# Patient Record
Sex: FEMALE | Race: BLACK OR AFRICAN AMERICAN | NOT HISPANIC OR LATINO | Employment: FULL TIME | URBAN - METROPOLITAN AREA
[De-identification: names, ages, dates, MRNs, and addresses within clinical notes are randomized per-mention and may not be internally consistent; named-entity substitution may affect disease eponyms.]

---

## 2018-04-06 ENCOUNTER — THERAPY VISIT (OUTPATIENT)
Dept: PHYSICAL THERAPY | Facility: CLINIC | Age: 33
End: 2018-04-06
Payer: COMMERCIAL

## 2018-04-06 DIAGNOSIS — Z47.89 AFTERCARE FOLLOWING SURGERY OF THE MUSCULOSKELETAL SYSTEM: ICD-10-CM

## 2018-04-06 DIAGNOSIS — S82.891A ANKLE FRACTURE, RIGHT, CLOSED, INITIAL ENCOUNTER: Primary | ICD-10-CM

## 2018-04-06 PROCEDURE — 97110 THERAPEUTIC EXERCISES: CPT | Mod: GP | Performed by: PHYSICAL THERAPIST

## 2018-04-06 PROCEDURE — 97161 PT EVAL LOW COMPLEX 20 MIN: CPT | Mod: GP | Performed by: PHYSICAL THERAPIST

## 2018-04-06 NOTE — LETTER
[place] [facility]  [address]  [address]  [facility] [address]  [phone]    2018    Re: Sara Ross   :   1985  MRN:  [id number]   REFERRING PHYSICIAN:   [facility]    [facility]  Date of Initial Evaluation:  18  Visits:  Rxs Used: 1  Reason for Referral:     Ankle fracture, right, closed, initial encounter  Aftercare following surgery of the musculoskeletal system    EVALUATION SUMMARY    [place] [facility] Initial Evaluation  Subjective:  Patient is a 32 year old female presenting with rehab right ankle/foot hpi.   Sara Ross is a 32 year old female with a right ankle condition.        Pt slipped and fell on ice on 2/3/2018, fractured both tibia and fibula, pt waited two weeks due to swelling, and then went in for surgery ORIF with hardware on 2/15/2018.  Pt was then put in boot for 4 weeks and was now told that she can start PT and work out of the boot with WBAT.  .    Patient reports pain:  Lateral and medial.    Pain is described as aching and is intermittent and reported as 3/10 and 5/10.  Associated symptoms:  Loss of strength and loss of motion/stiffness. Worse during: pt does have difficulty sleeping d/t her ankle.  Symptoms are exacerbated by walking, ascending stairs, descending stairs, bending/squatting, activity, weight bearing and standing   Since onset symptoms are gradually improving.    Previous treatment includes surgery.    General health as reported by patient is excellent.  Pertinent medical history includes:  None.      Current medications:  None as reported by patient.  Current occupation is Pt works at the [employer] in , is currently not working due to her issue.  Pt has a follow up with her surgeon on 2018 and will discuss return to work plans at that time.  .    Primary job tasks include:  Prolonged standing.  Barriers include:  None as reported by  patient.  Red flags:  None as reported by patient.                Objective:  Gait:    Gait Type:  Antalgic   Assistive Devices:  Brace    Ankle/Foot Evaluation  ROM:    AROM:    Dorsiflexion:  Left:   12  Right:   0  Plantarflexion:  Left:  66    Right:  54  Inversion:  Left:  40     Right:  19  Eversion:  25     Right:  15  Strength:    Dorsiflexion:  Left: 5/5     Pain:   Right: 5/5   Pain:  Plantarflexion: Left: 5/5   Pain:   Right: 4/5  Pain:  Inversion:Left: 5/5  Pain:     Right: 3+/5  Pain:  Eversion:Left: 5/5  Pain:  Right: 3+/5  Pain:  Re: Sara TRISTAN Asefa   :   1985    LIGAMENT TESTING: not assessed    SPECIAL TESTS: not assessed    PALPATION:   Right ankle tenderness present at:   Incisional    EDEMA:   Right ankle edema present at:  medial; lateral; anterior and posterior        FUNCTIONAL TESTS: Functional test ankle: single leg stance: 5 sec with compensations through core and with hands.    Assessment/Plan:    Patient is a 32 year old female with right side ankle complaints.    Patient has the following significant findings with corresponding treatment plan.                Diagnosis 1:  S/p R ankle tib/fib fracture with ORIF      Pain -  hot/cold therapy, manual therapy, self management, education and home program  Decreased ROM/flexibility - manual therapy and therapeutic exercise  Decreased strength - therapeutic exercise and therapeutic activities  Impaired gait - gait training  Impaired muscle performance - neuro re-education  Decreased function - therapeutic activities    Therapy Evaluation Codes:   1) History comprised of:   Personal factors that impact the plan of care:      Gender and Language.    Comorbidity factors that impact the plan of care are:      None.     Medications impacting care: None.  2) Examination of Body Systems comprised of:   Body structures and functions that impact the plan of care:      Ankle.   Activity limitations that impact the plan of care are:       Squatting/kneeling, Stairs, Standing, Walking and Working.  3) Clinical presentation characteristics are:   Evolving/Changing.  4) Decision-Making    Low complexity using standardized patient assessment instrument and/or measureable assessment of functional outcome.  Cumulative Therapy Evaluation is: Low complexity.    Previous and current functional limitations:  (See Goal Flow Sheet for this information)    Short term and Long term goals: (See Goal Flow Sheet for this information)     Communication ability:  Patient appears to be able to clearly communicate and understand verbal and written communication and follow directions correctly.  Treatment Explanation - The following has been discussed with the patient:   RX ordered/plan of care  Anticipated outcomes  Possible risks and side effects  This patient would benefit from PT intervention to resume normal activities.   Rehab potential is good.  Re: Sara Ross   :   1985    Frequency:  2 X week, once daily  Duration:  for 5 weeks  Discharge Plan:  Achieve all LTG.  Independent in home treatment program.  Reach maximal therapeutic benefit.    Please refer to the daily flowsheet for treatment today, total treatment time and time spent performing 1:1 timed codes.     Thank you for your referral.    INQUIRIES  Therapist: Griffin Martinez DPT   [facility]  [address]  [address] [place] [id number]  Phone: [phone]  Fax: [phone]

## 2018-04-06 NOTE — PROGRESS NOTES
[place] [facility] Initial Evaluation  Subjective:  Patient is a 32 year old female presenting with rehab right ankle/foot hpi.   Sara Ross is a 32 year old female with a right ankle condition.        Pt slipped and fell on ice on 2/3/2018, fractured both tibia and fibula, pt waited two weeks due to swelling, and then went in for surgery ORIF with hardware on 2/15/2018.  Pt was then put in boot for 4 weeks and was now told that she can start PT and work out of the boot with WBAT.  .    Patient reports pain:  Lateral and medial.    Pain is described as aching and is intermittent and reported as 3/10 and 5/10.  Associated symptoms:  Loss of strength and loss of motion/stiffness. Worse during: pt does have difficulty sleeping d/t her ankle.  Symptoms are exacerbated by walking, ascending stairs, descending stairs, bending/squatting, activity, weight bearing and standing   Since onset symptoms are gradually improving.    Previous treatment includes surgery.    General health as reported by patient is excellent.  Pertinent medical history includes:  None.      Current medications:  None as reported by patient.  Current occupation is Pt works at the [employer] in , is currently not working due to her issue.  Pt has a follow up with her surgeon on 4/16/2018 and will discuss return to work plans at that time.  .    Primary job tasks include:  Prolonged standing.    Barriers include:  None as reported by patient.    Red flags:  None as reported by patient.                        Objective:    Gait:    Gait Type:  Antalgic   Assistive Devices:  Brace            Ankle/Foot Evaluation  ROM:    AROM:    Dorsiflexion:  Left:   12  Right:   0  Plantarflexion:  Left:  66    Right:  54  Inversion:  Left:  40     Right:  19  Eversion:  25     Right:  15        Strength:    Dorsiflexion:  Left: 5/5     Pain:   Right: 5/5   Pain:  Plantarflexion: Left: 5/5   Pain:   Right: 4/5  Pain:  Inversion:Left: 5/5  Pain:      Right: 3+/5  Pain:  Eversion:Left: 5/5  Pain:  Right: 3+/5  Pain:                  LIGAMENT TESTING: not assessed              SPECIAL TESTS: not assessed    PALPATION:     Right ankle tenderness present at:   incisional  EDEMA:     Right ankle edema present at:  medial; lateral; anterior and posterior          FUNCTIONAL TESTS: Functional test ankle: single leg stance: 5 sec with compensations through core and with hands.                                                              General     ROS    Assessment/Plan:    Patient is a 32 year old female with right side ankle complaints.    Patient has the following significant findings with corresponding treatment plan.                Diagnosis 1:  S/p R ankle tib/fib fracture with ORIF      Pain -  hot/cold therapy, manual therapy, self management, education and home program  Decreased ROM/flexibility - manual therapy and therapeutic exercise  Decreased strength - therapeutic exercise and therapeutic activities  Impaired gait - gait training  Impaired muscle performance - neuro re-education  Decreased function - therapeutic activities    Therapy Evaluation Codes:   1) History comprised of:   Personal factors that impact the plan of care:      Gender and Language.    Comorbidity factors that impact the plan of care are:      None.     Medications impacting care: None.  2) Examination of Body Systems comprised of:   Body structures and functions that impact the plan of care:      Ankle.   Activity limitations that impact the plan of care are:      Squatting/kneeling, Stairs, Standing, Walking and Working.  3) Clinical presentation characteristics are:   Evolving/Changing.  4) Decision-Making    Low complexity using standardized patient assessment instrument and/or measureable assessment of functional outcome.  Cumulative Therapy Evaluation is: Low complexity.    Previous and current functional limitations:  (See Goal Flow Sheet for this information)    Short term and  Long term goals: (See Goal Flow Sheet for this information)     Communication ability:  Patient appears to be able to clearly communicate and understand verbal and written communication and follow directions correctly.  Treatment Explanation - The following has been discussed with the patient:   RX ordered/plan of care  Anticipated outcomes  Possible risks and side effects  This patient would benefit from PT intervention to resume normal activities.   Rehab potential is good.    Frequency:  2 X week, once daily  Duration:  for 5 weeks  Discharge Plan:  Achieve all LTG.  Independent in home treatment program.  Reach maximal therapeutic benefit.    Please refer to the daily flowsheet for treatment today, total treatment time and time spent performing 1:1 timed codes.

## 2018-04-10 ENCOUNTER — THERAPY VISIT (OUTPATIENT)
Dept: PHYSICAL THERAPY | Facility: CLINIC | Age: 33
End: 2018-04-10
Payer: COMMERCIAL

## 2018-04-10 DIAGNOSIS — S82.891A ANKLE FRACTURE, RIGHT, CLOSED, INITIAL ENCOUNTER: ICD-10-CM

## 2018-04-10 DIAGNOSIS — Z47.89 AFTERCARE FOLLOWING SURGERY OF THE MUSCULOSKELETAL SYSTEM: ICD-10-CM

## 2018-04-10 PROCEDURE — 97110 THERAPEUTIC EXERCISES: CPT | Mod: GP | Performed by: PHYSICAL THERAPIST

## 2018-04-10 PROCEDURE — 97112 NEUROMUSCULAR REEDUCATION: CPT | Mod: GP | Performed by: PHYSICAL THERAPIST

## 2018-04-12 ENCOUNTER — THERAPY VISIT (OUTPATIENT)
Dept: PHYSICAL THERAPY | Facility: CLINIC | Age: 33
End: 2018-04-12
Payer: COMMERCIAL

## 2018-04-12 DIAGNOSIS — S82.891A ANKLE FRACTURE, RIGHT, CLOSED, INITIAL ENCOUNTER: ICD-10-CM

## 2018-04-12 DIAGNOSIS — Z47.89 AFTERCARE FOLLOWING SURGERY OF THE MUSCULOSKELETAL SYSTEM: ICD-10-CM

## 2018-04-12 PROCEDURE — 97110 THERAPEUTIC EXERCISES: CPT | Mod: GP | Performed by: PHYSICAL THERAPIST

## 2018-04-17 ENCOUNTER — THERAPY VISIT (OUTPATIENT)
Dept: PHYSICAL THERAPY | Facility: CLINIC | Age: 33
End: 2018-04-17
Payer: COMMERCIAL

## 2018-04-17 DIAGNOSIS — Z47.89 AFTERCARE FOLLOWING SURGERY OF THE MUSCULOSKELETAL SYSTEM: ICD-10-CM

## 2018-04-17 DIAGNOSIS — S82.891A ANKLE FRACTURE, RIGHT, CLOSED, INITIAL ENCOUNTER: ICD-10-CM

## 2018-04-17 PROCEDURE — 97110 THERAPEUTIC EXERCISES: CPT | Mod: GP | Performed by: PHYSICAL THERAPIST

## 2018-04-20 ENCOUNTER — THERAPY VISIT (OUTPATIENT)
Dept: PHYSICAL THERAPY | Facility: CLINIC | Age: 33
End: 2018-04-20
Payer: COMMERCIAL

## 2018-04-20 DIAGNOSIS — Z47.89 AFTERCARE FOLLOWING SURGERY OF THE MUSCULOSKELETAL SYSTEM: ICD-10-CM

## 2018-04-20 DIAGNOSIS — S82.891A ANKLE FRACTURE, RIGHT, CLOSED, INITIAL ENCOUNTER: ICD-10-CM

## 2018-04-20 PROCEDURE — 97112 NEUROMUSCULAR REEDUCATION: CPT | Mod: GP

## 2018-04-20 PROCEDURE — 97110 THERAPEUTIC EXERCISES: CPT | Mod: GP

## 2018-04-24 ENCOUNTER — THERAPY VISIT (OUTPATIENT)
Dept: PHYSICAL THERAPY | Facility: CLINIC | Age: 33
End: 2018-04-24
Payer: COMMERCIAL

## 2018-04-24 DIAGNOSIS — S82.891A ANKLE FRACTURE, RIGHT, CLOSED, INITIAL ENCOUNTER: ICD-10-CM

## 2018-04-24 DIAGNOSIS — Z47.89 AFTERCARE FOLLOWING SURGERY OF THE MUSCULOSKELETAL SYSTEM: ICD-10-CM

## 2018-04-24 PROCEDURE — 97110 THERAPEUTIC EXERCISES: CPT | Mod: GP | Performed by: PHYSICAL THERAPIST

## 2018-04-26 ENCOUNTER — THERAPY VISIT (OUTPATIENT)
Dept: PHYSICAL THERAPY | Facility: CLINIC | Age: 33
End: 2018-04-26
Payer: COMMERCIAL

## 2018-04-26 DIAGNOSIS — Z47.89 AFTERCARE FOLLOWING SURGERY OF THE MUSCULOSKELETAL SYSTEM: ICD-10-CM

## 2018-04-26 DIAGNOSIS — S82.891A ANKLE FRACTURE, RIGHT, CLOSED, INITIAL ENCOUNTER: ICD-10-CM

## 2018-04-26 PROCEDURE — 97112 NEUROMUSCULAR REEDUCATION: CPT | Mod: GP | Performed by: PHYSICAL THERAPIST

## 2018-04-26 PROCEDURE — 97110 THERAPEUTIC EXERCISES: CPT | Mod: GP | Performed by: PHYSICAL THERAPIST

## 2018-05-03 ENCOUNTER — THERAPY VISIT (OUTPATIENT)
Dept: PHYSICAL THERAPY | Facility: CLINIC | Age: 33
End: 2018-05-03

## 2018-05-03 DIAGNOSIS — S82.891A ANKLE FRACTURE, RIGHT, CLOSED, INITIAL ENCOUNTER: ICD-10-CM

## 2018-05-03 DIAGNOSIS — Z47.89 AFTERCARE FOLLOWING SURGERY OF THE MUSCULOSKELETAL SYSTEM: ICD-10-CM

## 2018-05-03 PROCEDURE — 97110 THERAPEUTIC EXERCISES: CPT | Mod: GP | Performed by: PHYSICAL THERAPIST

## 2018-05-03 PROCEDURE — 97112 NEUROMUSCULAR REEDUCATION: CPT | Mod: GP | Performed by: PHYSICAL THERAPIST

## 2018-05-08 ENCOUNTER — THERAPY VISIT (OUTPATIENT)
Dept: PHYSICAL THERAPY | Facility: CLINIC | Age: 33
End: 2018-05-08
Payer: COMMERCIAL

## 2018-05-08 DIAGNOSIS — Z47.89 AFTERCARE FOLLOWING SURGERY OF THE MUSCULOSKELETAL SYSTEM: ICD-10-CM

## 2018-05-08 DIAGNOSIS — S82.891A ANKLE FRACTURE, RIGHT, CLOSED, INITIAL ENCOUNTER: ICD-10-CM

## 2018-05-08 PROCEDURE — 97112 NEUROMUSCULAR REEDUCATION: CPT | Mod: GP | Performed by: PHYSICAL THERAPIST

## 2018-05-08 PROCEDURE — 97110 THERAPEUTIC EXERCISES: CPT | Mod: GP | Performed by: PHYSICAL THERAPIST

## 2018-05-10 ENCOUNTER — THERAPY VISIT (OUTPATIENT)
Dept: PHYSICAL THERAPY | Facility: CLINIC | Age: 33
End: 2018-05-10
Payer: COMMERCIAL

## 2018-05-10 DIAGNOSIS — Z47.89 AFTERCARE FOLLOWING SURGERY OF THE MUSCULOSKELETAL SYSTEM: ICD-10-CM

## 2018-05-10 DIAGNOSIS — S82.891A ANKLE FRACTURE, RIGHT, CLOSED, INITIAL ENCOUNTER: ICD-10-CM

## 2018-05-10 PROCEDURE — 97112 NEUROMUSCULAR REEDUCATION: CPT | Mod: GP | Performed by: PHYSICAL THERAPIST

## 2018-05-10 PROCEDURE — 97110 THERAPEUTIC EXERCISES: CPT | Mod: GP | Performed by: PHYSICAL THERAPIST

## 2018-05-10 NOTE — PROGRESS NOTES
Subjective:  HPI                    Objective:  System    Physical Exam    General     ROS    Assessment/Plan:    PROGRESS  REPORT    Progress reporting period is from 4/6/2018 to 5/10/2018.       SUBJECTIVE  Pt is overall noticing improvement, easier with walking but is still limping considerably and notices more swelling when she is up on her foot for 15 mins or more.  Pt is concerned about being able to go back to work.   Current pain level is 2/10  .     Initial Pain level: 10/10.   Changes in function:  Yes (See Goal flowsheet attached for changes in current functional level)  Adverse reaction to treatment or activity: None    OBJECTIVE  Gait:    Gait Type:  Antalgic, decreased R ankle DF makes it difficult to shift her weight over her R foot when walking.  Ankle AROM:    Dorsiflexion:  Left:   12  Right:   5  Plantarflexion:  Left: 75    Right:  65  Inversion:  Left:  40     Right:  30  Eversion:  25     Right:  15  Strength:    Dorsiflexion:  Left: 5/5     Pain:   Right: 5/5   Pain:  Plantarflexion: Left: 5/5   Pain:   Right: 4+/5  Pain:  Inversion:Left: 5/5  Pain:     Right: 4/5  Pain:  Eversion:Left: 5/5  Pain:  Right: 4/5  Pain:  PALPATION:   Right ankle tenderness present at:   incisional  EDEMA:   Right ankle edema present at:  medial; lateral; anterior and posterior  FUNCTIONAL TESTS: Functional test ankle: single leg stance: 10-15 sec        ASSESSMENT/PLAN  Updated problem list and treatment plan: Diagnosis 1:  S/p R ankle tib/fib fracture with ORIF      Pain -  hot/cold therapy, manual therapy, self management, education and home program  Decreased ROM/flexibility - manual therapy and therapeutic exercise  Decreased joint mobility - manual therapy and therapeutic exercise  Decreased strength - therapeutic exercise and therapeutic activities  Impaired balance - neuro re-education and therapeutic activities  Impaired gait - gait training  Impaired muscle performance - neuro re-education  Decreased  function - therapeutic activities  STG/LTGs have been met or progress has been made towards goals:  Yes (See Goal flow sheet completed today.)  Assessment of Progress: The patient's condition is improving.  Self Management Plans:  Patient has been instructed in a home treatment program.  Patient  has been instructed in self management of symptoms.  I have re-evaluated this patient and find that the nature, scope, duration and intensity of the therapy is appropriate for the medical condition of the patient.  Sara continues to require the following intervention to meet STG and LTG's:  PT    Recommendations:  This patient would benefit from continued therapy.       Please refer to the daily flowsheet for treatment today, total treatment time and time spent performing 1:1 timed codes.

## 2018-05-22 ENCOUNTER — THERAPY VISIT (OUTPATIENT)
Dept: PHYSICAL THERAPY | Facility: CLINIC | Age: 33
End: 2018-05-22

## 2018-05-22 DIAGNOSIS — Z47.89 AFTERCARE FOLLOWING SURGERY OF THE MUSCULOSKELETAL SYSTEM: ICD-10-CM

## 2018-05-22 DIAGNOSIS — S82.891A ANKLE FRACTURE, RIGHT, CLOSED, INITIAL ENCOUNTER: ICD-10-CM

## 2018-05-22 PROCEDURE — 97112 NEUROMUSCULAR REEDUCATION: CPT | Mod: GP | Performed by: PHYSICAL THERAPIST

## 2018-05-22 PROCEDURE — 97110 THERAPEUTIC EXERCISES: CPT | Mod: GP | Performed by: PHYSICAL THERAPIST

## 2018-05-24 ENCOUNTER — THERAPY VISIT (OUTPATIENT)
Dept: PHYSICAL THERAPY | Facility: CLINIC | Age: 33
End: 2018-05-24

## 2018-05-24 DIAGNOSIS — Z47.89 AFTERCARE FOLLOWING SURGERY OF THE MUSCULOSKELETAL SYSTEM: ICD-10-CM

## 2018-05-24 DIAGNOSIS — S82.891A ANKLE FRACTURE, RIGHT, CLOSED, INITIAL ENCOUNTER: ICD-10-CM

## 2018-05-24 PROCEDURE — 97110 THERAPEUTIC EXERCISES: CPT | Mod: GP | Performed by: PHYSICAL THERAPIST

## 2018-05-24 PROCEDURE — 97112 NEUROMUSCULAR REEDUCATION: CPT | Mod: GP | Performed by: PHYSICAL THERAPIST

## 2018-05-29 ENCOUNTER — THERAPY VISIT (OUTPATIENT)
Dept: PHYSICAL THERAPY | Facility: CLINIC | Age: 33
End: 2018-05-29

## 2018-05-29 DIAGNOSIS — Z47.89 AFTERCARE FOLLOWING SURGERY OF THE MUSCULOSKELETAL SYSTEM: ICD-10-CM

## 2018-05-29 DIAGNOSIS — S82.891A ANKLE FRACTURE, RIGHT, CLOSED, INITIAL ENCOUNTER: ICD-10-CM

## 2018-05-29 PROCEDURE — 97110 THERAPEUTIC EXERCISES: CPT | Mod: GP | Performed by: PHYSICAL THERAPIST

## 2018-05-29 PROCEDURE — 97112 NEUROMUSCULAR REEDUCATION: CPT | Mod: GP | Performed by: PHYSICAL THERAPIST

## 2018-05-31 ENCOUNTER — THERAPY VISIT (OUTPATIENT)
Dept: PHYSICAL THERAPY | Facility: CLINIC | Age: 33
End: 2018-05-31

## 2018-05-31 DIAGNOSIS — Z47.89 AFTERCARE FOLLOWING SURGERY OF THE MUSCULOSKELETAL SYSTEM: ICD-10-CM

## 2018-05-31 DIAGNOSIS — S82.891A ANKLE FRACTURE, RIGHT, CLOSED, INITIAL ENCOUNTER: ICD-10-CM

## 2018-05-31 PROCEDURE — 97110 THERAPEUTIC EXERCISES: CPT | Mod: GP | Performed by: PHYSICAL THERAPIST

## 2018-05-31 PROCEDURE — 97112 NEUROMUSCULAR REEDUCATION: CPT | Mod: GP | Performed by: PHYSICAL THERAPIST

## 2018-06-05 ENCOUNTER — THERAPY VISIT (OUTPATIENT)
Dept: PHYSICAL THERAPY | Facility: CLINIC | Age: 33
End: 2018-06-05
Payer: COMMERCIAL

## 2018-06-05 DIAGNOSIS — Z47.89 AFTERCARE FOLLOWING SURGERY OF THE MUSCULOSKELETAL SYSTEM: ICD-10-CM

## 2018-06-05 DIAGNOSIS — S82.891A ANKLE FRACTURE, RIGHT, CLOSED, INITIAL ENCOUNTER: ICD-10-CM

## 2018-06-05 PROCEDURE — 97110 THERAPEUTIC EXERCISES: CPT | Mod: GP | Performed by: PHYSICAL THERAPIST

## 2018-06-05 PROCEDURE — 97112 NEUROMUSCULAR REEDUCATION: CPT | Mod: GP | Performed by: PHYSICAL THERAPIST

## 2018-06-19 ENCOUNTER — THERAPY VISIT (OUTPATIENT)
Dept: PHYSICAL THERAPY | Facility: CLINIC | Age: 33
End: 2018-06-19
Payer: COMMERCIAL

## 2018-06-19 DIAGNOSIS — Z47.89 AFTERCARE FOLLOWING SURGERY OF THE MUSCULOSKELETAL SYSTEM: ICD-10-CM

## 2018-06-19 DIAGNOSIS — S82.891A ANKLE FRACTURE, RIGHT, CLOSED, INITIAL ENCOUNTER: ICD-10-CM

## 2018-06-19 PROCEDURE — 97112 NEUROMUSCULAR REEDUCATION: CPT | Mod: GP | Performed by: PHYSICAL THERAPIST

## 2018-06-19 PROCEDURE — 97110 THERAPEUTIC EXERCISES: CPT | Mod: GP | Performed by: PHYSICAL THERAPIST

## 2018-06-19 NOTE — PROGRESS NOTES
Subjective:  HPI                    Objective:  System    Physical Exam    General     ROS    Assessment/Plan:    PROGRESS  REPORT    Progress reporting period is from 5/10/2018 to 6/19/2018.       SUBJECTIVE  Progress has slowed, pt is noticing very minor improvements in ROM and still has pain especially with first few steps.  Overall pt has progressed a great deal however is feeling like it is slowing as of lately.   Current pain level is 2/10  .     Previous pain level was  10/10 Initial Pain level: 10/10.   Changes in function:  Yes (See Goal flowsheet attached for changes in current functional level)  Adverse reaction to treatment or activity: None    OBJECTIVE  Gait:    Gait Type:  mildly antalgic, shortened stride length with L foot likely due to decreased R ankle DF.  Ankle AROM:    Dorsiflexion:  Left:   12  Right:   11  Plantarflexion:  Left: 75    Right:  67  Inversion:  Left:  40     Right:  28  Eversion:  25     Right:  20  Strength:    Dorsiflexion:  Left: 5/5     Pain:   Right: 5/5   Pain:  Plantarflexion: Left: 5/5   Pain:   Right: 4+/5  Pain:  Inversion:Left: 5/5  Pain:     Right: 4/5  Pain:  Eversion:Left: 5/5  Pain:  Right: 4/5  Pain:  PALPATION:   Right ankle tenderness present at:   incisional  EDEMA:   Right ankle edema present at:  medial; lateral; anterior and posterior  FUNCTIONAL TESTS: Functional test ankle: single leg stance: 60 sec, 30 sec on airex pad    ASSESSMENT/PLAN  Updated problem list and treatment plan: Diagnosis 1:  S/p R ankle tib/fib fracture with ORIF    Pain -  hot/cold therapy, self management, education and home program  Decreased ROM/flexibility - manual therapy and therapeutic exercise  Decreased joint mobility - manual therapy and therapeutic exercise  Impaired gait - gait training  Impaired muscle performance - neuro re-education  Decreased function - therapeutic activities  STG/LTGs have been met or progress has been made towards goals:  Yes (See Goal flow sheet  completed today.)  Assessment of Progress: The patient's progress has slowed.  Self Management Plans:  Patient has been instructed in a home treatment program.  Patient  has been instructed in self management of symptoms.  I have re-evaluated this patient and find that the nature, scope, duration and intensity of the therapy is appropriate for the medical condition of the patient.  Sara continues to require the following intervention to meet STG and LTG's:  PT    Recommendations:  This patient would benefit from continued therapy.       Please refer to the daily flowsheet for treatment today, total treatment time and time spent performing 1:1 timed codes.

## 2018-06-26 ENCOUNTER — THERAPY VISIT (OUTPATIENT)
Dept: PHYSICAL THERAPY | Facility: CLINIC | Age: 33
End: 2018-06-26
Payer: COMMERCIAL

## 2018-06-26 DIAGNOSIS — Z47.89 AFTERCARE FOLLOWING SURGERY OF THE MUSCULOSKELETAL SYSTEM: ICD-10-CM

## 2018-06-26 DIAGNOSIS — S82.891A ANKLE FRACTURE, RIGHT, CLOSED, INITIAL ENCOUNTER: ICD-10-CM

## 2018-06-26 PROCEDURE — 97112 NEUROMUSCULAR REEDUCATION: CPT | Mod: GP | Performed by: PHYSICAL THERAPIST

## 2018-06-26 PROCEDURE — 97110 THERAPEUTIC EXERCISES: CPT | Mod: GP | Performed by: PHYSICAL THERAPIST

## 2018-07-03 ENCOUNTER — THERAPY VISIT (OUTPATIENT)
Dept: PHYSICAL THERAPY | Facility: CLINIC | Age: 33
End: 2018-07-03
Payer: COMMERCIAL

## 2018-07-03 DIAGNOSIS — S82.891A ANKLE FRACTURE, RIGHT, CLOSED, INITIAL ENCOUNTER: ICD-10-CM

## 2018-07-03 DIAGNOSIS — Z47.89 AFTERCARE FOLLOWING SURGERY OF THE MUSCULOSKELETAL SYSTEM: ICD-10-CM

## 2018-07-03 PROCEDURE — 97140 MANUAL THERAPY 1/> REGIONS: CPT | Mod: GP | Performed by: PHYSICAL THERAPIST

## 2018-07-03 PROCEDURE — 97112 NEUROMUSCULAR REEDUCATION: CPT | Mod: GP | Performed by: PHYSICAL THERAPIST

## 2018-07-03 PROCEDURE — 97110 THERAPEUTIC EXERCISES: CPT | Mod: GP | Performed by: PHYSICAL THERAPIST

## 2018-07-10 ENCOUNTER — THERAPY VISIT (OUTPATIENT)
Dept: PHYSICAL THERAPY | Facility: CLINIC | Age: 33
End: 2018-07-10
Payer: COMMERCIAL

## 2018-07-10 DIAGNOSIS — S82.891A ANKLE FRACTURE, RIGHT, CLOSED, INITIAL ENCOUNTER: ICD-10-CM

## 2018-07-10 DIAGNOSIS — Z47.89 AFTERCARE FOLLOWING SURGERY OF THE MUSCULOSKELETAL SYSTEM: ICD-10-CM

## 2018-07-10 PROCEDURE — 97110 THERAPEUTIC EXERCISES: CPT | Mod: GP | Performed by: PHYSICAL THERAPIST

## 2018-07-10 PROCEDURE — 97112 NEUROMUSCULAR REEDUCATION: CPT | Mod: GP | Performed by: PHYSICAL THERAPIST

## 2018-07-31 ENCOUNTER — THERAPY VISIT (OUTPATIENT)
Dept: PHYSICAL THERAPY | Facility: CLINIC | Age: 33
End: 2018-07-31
Payer: COMMERCIAL

## 2018-07-31 DIAGNOSIS — Z47.89 AFTERCARE FOLLOWING SURGERY OF THE MUSCULOSKELETAL SYSTEM: ICD-10-CM

## 2018-07-31 DIAGNOSIS — S82.891A ANKLE FRACTURE, RIGHT, CLOSED, INITIAL ENCOUNTER: ICD-10-CM

## 2018-07-31 PROCEDURE — 97112 NEUROMUSCULAR REEDUCATION: CPT | Mod: GP | Performed by: PHYSICAL THERAPIST

## 2018-07-31 PROCEDURE — 97110 THERAPEUTIC EXERCISES: CPT | Mod: GP | Performed by: PHYSICAL THERAPIST

## 2019-01-24 PROBLEM — S82.891A ANKLE FRACTURE, RIGHT, CLOSED, INITIAL ENCOUNTER: Status: RESOLVED | Noted: 2018-04-06 | Resolved: 2019-01-24

## 2019-01-24 PROBLEM — Z47.89 AFTERCARE FOLLOWING SURGERY OF THE MUSCULOSKELETAL SYSTEM: Status: RESOLVED | Noted: 2018-04-06 | Resolved: 2019-01-24

## 2019-01-24 NOTE — PROGRESS NOTES
Discharge Note    Progress reporting period is from initial eval/last PN to Jul 31, 2018.     Sara failed to return and current status is unknown.  Please see information below for last relevant information on current status.  Patient seen for 20 visits.  SUBJECTIVE  Subjective changes noted by patient:  Doing very well, normal gait and stairs.  .  Current pain level is 2/10.     Previous pain level was  10/10.   Changes in function:  Yes (See Goal flowsheet attached for changes in current functional level)  Adverse reaction to treatment or activity: None    OBJECTIVE  Changes noted in objective findings: Gait: normal.  Stairs: normal with an early heel lift when leading with left leg.  Ankle DF: L 20, R 15     ASSESSMENT/PLAN  Diagnosis: R ankle tib-fib fracture and ORIF   DIAGP:  Diagnoses of Ankle fracture, right, closed, initial encounter and Aftercare following surgery of the musculoskeletal system were pertinent to this visit.  STG/LTGs have been met or progress has been made towards goals:  Yes, please see goal flowsheet for most current information  Assessment of Progress: current status is unknown.    Last current status: Pt is progressing well   Self Management Plans:  HEP  I have re-evaluated this patient and find that the nature, scope, duration and intensity of the therapy is appropriate for the medical condition of the patient.  Sara continues to require the following intervention to meet STG and LTG's:  HEP.    Recommendations:  Discharge with current home program.  Patient to follow up with MD as needed.    Please refer to the daily flowsheet for treatment today, total treatment time and time spent performing 1:1 timed codes.

## 2020-05-08 ENCOUNTER — OFFICE VISIT (OUTPATIENT)
Dept: URGENT CARE | Facility: URGENT CARE | Age: 35
End: 2020-05-08
Payer: COMMERCIAL

## 2020-05-08 VITALS
SYSTOLIC BLOOD PRESSURE: 134 MMHG | DIASTOLIC BLOOD PRESSURE: 83 MMHG | TEMPERATURE: 97.5 F | HEART RATE: 75 BPM | OXYGEN SATURATION: 100 % | WEIGHT: 147.5 LBS

## 2020-05-08 DIAGNOSIS — H10.13 ALLERGIC CONJUNCTIVITIS, BILATERAL: Primary | ICD-10-CM

## 2020-05-08 PROCEDURE — 99203 OFFICE O/P NEW LOW 30 MIN: CPT | Performed by: FAMILY MEDICINE

## 2020-05-08 RX ORDER — OLOPATADINE HYDROCHLORIDE 1 MG/ML
SOLUTION/ DROPS OPHTHALMIC
Qty: 1 ML | Status: SHIPPED | OUTPATIENT
Start: 2020-05-08

## 2020-05-09 NOTE — PROGRESS NOTES
CHIEF COMPLAINT    Eye irritation.      HISTORY    She has a 4 day h/o ryrs itching, some redness, no mattering. Vision OK.    She tried 2 OTC eye meds w/o benefit.    She has previously been on Patanol.      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  Taking precautions.      Past Medical History:   Diagnosis Date     NO ACTIVE PROBLEMS          EXAM  /83   Pulse 75   Temp 97.5  F (36.4  C)   Wt 66.9 kg (147 lb 8 oz)   SpO2 100%     No lid swelling.  Mild conjunctival redness.  No watering or purulent drainage  PERRL      (H10.13) Allergic conjunctivitis, bilateral  (primary encounter diagnosis)  Comment:   Plan: olopatadine (PATANOL) 0.1 % ophthalmic solution            (H10.45) Other chronic allergic conjunctivitis  Comment:   Plan:     Patient advised to return for worsening or persistent symptoms.

## 2022-05-28 ENCOUNTER — OFFICE VISIT (OUTPATIENT)
Dept: URGENT CARE | Facility: URGENT CARE | Age: 37
End: 2022-05-28
Payer: COMMERCIAL

## 2022-05-28 VITALS
OXYGEN SATURATION: 98 % | SYSTOLIC BLOOD PRESSURE: 115 MMHG | DIASTOLIC BLOOD PRESSURE: 70 MMHG | TEMPERATURE: 98 F | HEART RATE: 68 BPM

## 2022-05-28 DIAGNOSIS — Z20.822 SUSPECTED 2019 NOVEL CORONAVIRUS INFECTION: Primary | ICD-10-CM

## 2022-05-28 DIAGNOSIS — R07.0 THROAT PAIN: ICD-10-CM

## 2022-05-28 LAB — DEPRECATED S PYO AG THROAT QL EIA: NEGATIVE

## 2022-05-28 PROCEDURE — 99213 OFFICE O/P EST LOW 20 MIN: CPT | Performed by: PHYSICIAN ASSISTANT

## 2022-05-28 PROCEDURE — 87651 STREP A DNA AMP PROBE: CPT | Performed by: PHYSICIAN ASSISTANT

## 2022-05-28 PROCEDURE — U0003 INFECTIOUS AGENT DETECTION BY NUCLEIC ACID (DNA OR RNA); SEVERE ACUTE RESPIRATORY SYNDROME CORONAVIRUS 2 (SARS-COV-2) (CORONAVIRUS DISEASE [COVID-19]), AMPLIFIED PROBE TECHNIQUE, MAKING USE OF HIGH THROUGHPUT TECHNOLOGIES AS DESCRIBED BY CMS-2020-01-R: HCPCS | Performed by: PHYSICIAN ASSISTANT

## 2022-05-28 PROCEDURE — U0005 INFEC AGEN DETEC AMPLI PROBE: HCPCS | Performed by: PHYSICIAN ASSISTANT

## 2022-05-28 NOTE — PROGRESS NOTES
SUBJECTIVE:   Sara Ross is a 36 year old female presenting with a chief complaint of ST that started this morning   No fevers or other sx.  Wants to be checked for strep and COVID   Vaccinated for COVID  No exposure that aware of  Onset of symptoms was 1 day(s) ago.  Course of illness is same.    Severity mild  Current and Associated symptoms: no other sx   Treatment measures tried include Fluids and Rest.  Predisposing factors include None.    Past Medical History:   Diagnosis Date     NO ACTIVE PROBLEMS      Current Outpatient Medications   Medication Sig Dispense Refill     olopatadine (PATANOL) 0.1 % ophthalmic solution one drop each eye twice per day 1 mL prn     Social History     Tobacco Use     Smoking status: Never Smoker     Smokeless tobacco: Never Used   Substance Use Topics     Alcohol use: Yes     Comment: rare       ROS:  Review of systems negative except as stated above.    OBJECTIVE:  /70 (BP Location: Right arm, Patient Position: Sitting, Cuff Size: Adult Large)   Pulse 68   Temp 98  F (36.7  C)   SpO2 98%   GENERAL APPEARANCE: healthy, alert and no distress  EYES: EOMI,  PERRL, conjunctiva clear  HENT: ear canals and TM's normal.  Nose and mouth without ulcers, erythema or lesions  No exudate   NECK: supple, nontender, no lymphadenopathy  RESP: lungs clear to auscultation - no rales, rhonchi or wheezes  CV: regular rates and rhythm, normal S1 S2, no murmur noted  ABDOMEN:  soft, nontender, no HSM or masses and bowel sounds normal  SKIN: no suspicious lesions or rashes    RST  Negative     COVID  Results pending     assessment/plan:  (Z20.822) Suspected 2019 novel coronavirus infection  (primary encounter diagnosis)  Comment:   Plan: Symptomatic; Yes; 5/28/2022 COVID-19 Virus         (Coronavirus) by PCR Nose        Results pending . OTC med for sx relief and to Follow-up with PCP as needed    (R07.0) Throat pain  Comment:   Plan: Streptococcus A Rapid Screen w/Reflex to PCR -          Clinic Collect, Group A Streptococcus PCR         Throat Swab         Negative strep and culture pending

## 2022-05-29 LAB
GROUP A STREP BY PCR: NOT DETECTED
SARS-COV-2 RNA RESP QL NAA+PROBE: NEGATIVE

## 2022-05-31 ENCOUNTER — TELEPHONE (OUTPATIENT)
Dept: URGENT CARE | Facility: URGENT CARE | Age: 37
End: 2022-05-31
Payer: COMMERCIAL

## 2022-05-31 NOTE — TELEPHONE ENCOUNTER
Patient called requesting to know recent lab result notes. Informed patient that her results are negative.     Alondra Victor RN on 5/31/2022 at 3:30 PM

## 2022-06-26 ENCOUNTER — OFFICE VISIT (OUTPATIENT)
Dept: URGENT CARE | Facility: URGENT CARE | Age: 37
End: 2022-06-26
Payer: COMMERCIAL

## 2022-06-26 VITALS
OXYGEN SATURATION: 99 % | SYSTOLIC BLOOD PRESSURE: 110 MMHG | DIASTOLIC BLOOD PRESSURE: 68 MMHG | HEART RATE: 83 BPM | TEMPERATURE: 98.8 F

## 2022-06-26 DIAGNOSIS — J06.9 VIRAL URI: ICD-10-CM

## 2022-06-26 DIAGNOSIS — R07.0 THROAT PAIN: Primary | ICD-10-CM

## 2022-06-26 DIAGNOSIS — Z20.822 SUSPECTED 2019 NOVEL CORONAVIRUS INFECTION: ICD-10-CM

## 2022-06-26 LAB — DEPRECATED S PYO AG THROAT QL EIA: NEGATIVE

## 2022-06-26 PROCEDURE — 87651 STREP A DNA AMP PROBE: CPT | Performed by: FAMILY MEDICINE

## 2022-06-26 PROCEDURE — U0005 INFEC AGEN DETEC AMPLI PROBE: HCPCS | Performed by: FAMILY MEDICINE

## 2022-06-26 PROCEDURE — 99213 OFFICE O/P EST LOW 20 MIN: CPT | Performed by: FAMILY MEDICINE

## 2022-06-26 PROCEDURE — U0003 INFECTIOUS AGENT DETECTION BY NUCLEIC ACID (DNA OR RNA); SEVERE ACUTE RESPIRATORY SYNDROME CORONAVIRUS 2 (SARS-COV-2) (CORONAVIRUS DISEASE [COVID-19]), AMPLIFIED PROBE TECHNIQUE, MAKING USE OF HIGH THROUGHPUT TECHNOLOGIES AS DESCRIBED BY CMS-2020-01-R: HCPCS | Performed by: FAMILY MEDICINE

## 2022-06-26 NOTE — PROGRESS NOTES
Assessment & Plan     Throat pain  - Streptococcus A Rapid Screen w/Reflex to PCR - Clinic Collect  - Group A Streptococcus PCR Throat Swab  Viral URI  - Symptomatic; Yes; 6/25/2022 COVID-19 Virus (Coronavirus) by PCR Nose     Unremarkable exam and stable vitals patient did consent to COVID testing today we discussed that should return the next 24 hours we will only call if positive I encouraged that she set up for my chart so she has access to all her lab tests.  Symptomatic measures recommended including Tylenol as needed for pain and good oral hydration follow-up as needed if symptoms worsen. She was advised to quarantine.     See AVS summary for additional recommendations reviewed with patient during this visit.       Chris Jara MD   [facility]    Harjeet Ruiz is a 36 year old female who presents to clinic today for the following health issues:  Chief Complaint   Patient presents with     Pharyngitis     Yesterday, body aches, headache     HPI      Symptoms beginning: yesterday  Patient was exposed to a coworker who did have symptoms of a cold primarily a cough she was frustrated as this patient did not wear proper PPE.  There is no known exposures to COVID.  She has not had COVID in the last 6 months.   Absent of cough. Has a runny nose  no vomiting/diarrhea, denies loss of taste/smell  Slight body aches or headache has tried tylenol      Previously has had 3 doses of COVID vaccination      Patient Active Problem List    Diagnosis Date Noted     CARDIOVASCULAR SCREENING; LDL GOAL LESS THAN 160 02/10/2010     Priority: Medium       Current Outpatient Medications   Medication     olopatadine (PATANOL) 0.1 % ophthalmic solution     No current facility-administered medications for this visit.           Objective    /68 (BP Location: Right arm, Patient Position: Sitting, Cuff Size: Adult Regular)   Pulse 83   Temp 98.8  F (37.1  C) (Tympanic)   SpO2 99%   Physical Exam     GEN:  NAD  CV: RRR no m/r/g  Pulm: clear bilaterally  Neck: no enlarged lymph nodes  Throat:  No enlarged tonsils     Results for orders placed or performed in visit on 06/26/22   Streptococcus A Rapid Screen w/Reflex to PCR - Clinic Collect     Status: Normal    Specimen: Throat; Swab   Result Value Ref Range    Group A Strep antigen Negative Negative           The use of [employer] dictation services may have been used to construct the content in this note; any grammatical or spelling errors are non-intentional. Please contact the author of this note directly if you are in need of any clarification.

## 2022-06-26 NOTE — PATIENT INSTRUCTIONS
COVID test return the next 24 hours our team will only call you if this is positive.      At this time I recommend you quarantine as a precaution until the test returns    Tylenol 500mg every 4-6 hours as needed for headaches/body aches      Drink 100 ounces of water a day to keep hydrated      If you develop a mild cough he can use over-the-counter cough syrup such as Robitussin or generic versions to treat your symptoms    Expect gradual  improvement over the next 5 to 7 days      If you develop worsening of your symptoms please come into urgent care to be evaluated

## 2022-06-27 ENCOUNTER — TELEPHONE (OUTPATIENT)
Dept: NURSING | Facility: CLINIC | Age: 37
End: 2022-06-27

## 2022-06-27 LAB
GROUP A STREP BY PCR: NOT DETECTED
SARS-COV-2 RNA RESP QL NAA+PROBE: POSITIVE

## 2022-06-27 NOTE — TELEPHONE ENCOUNTER
Coronavirus (COVID-19) Notification    Caller Name (Patient, parent, daughter/son, grandparent, etc)  Sara    Reason for call  Notify of Positive Coronavirus (COVID-19) lab results, assess symptoms,  review [facility] recommendations    Lab Result    Lab test:  2019-nCoV rRt-PCR or SARS-CoV-2 PCR    Oropharyngeal AND/OR nasopharyngeal swabs is POSITIVE for 2019-nCoV RNA/SARS-COV-2 PCR (COVID-19 virus)      Gather patient reported symptoms   Assessment   Current Symptoms at time of phone call, reported by patient: (if no symptoms, document: No symptoms] Yes   Date of symptom(s) onset (if applicable) 6/26/22     If at time of call, Patients symptoms have worsened, the Patient should contact 911 or have someone drive them to Emergency Dept promptly:      If Patient calling 911, inform 911 personal that you have tested positive for the Coronavirus (COVID-19).  Place mask on and await 911 to arrive.    If Emergency Dept, If possible, please have another adult drive you to the Emergency Dept but you need to wear mask when in contact with other people.      Treatment Options:   Patient classified as COVID treatment eligible by Epic high risk algorithm: Yes  Is the patient symptomatic at the time of result notification? Yes. Was the onset of symptoms within the last 5 days? Yes.   There are now oral medications available for the treatment of COVID-19.  Taking one of these medications within the first five days of symptoms (when people may not yet feel severely ill) has been shown to make people feel better, prevent them from getting sicker, and preventing hospitalization and death.   Does the patient agree to have a visit with a provider to discuss treatment options? Yes. Is the patient seen at [facility]?  No: Warm transfer caller to [phone] to be scheduled with a virtual urgent provider.  During transfer, instruct  on appropriate time frame for visit     Review information with  Patient    Your result was positive. This means you have COVID-19 (coronavirus).    How can I protect others?    These guidelines are for isolating before returning to work, school or .    If you DO have symptoms    Stay home and away from others     For at least 5 days after your symptoms started, AND    You are fever free for 24 hours (with no medicine that reduces fever), AND    Your other symptoms are better    Wear a mask for 10 full days anytime you are around others    If you DON'T have symptoms    Stay home and away from others for at least 5 days after your positive test    Wear a mask for 10 full days anytime you are around others    There may be different guidelines for healthcare facilities.  Please check with the specific sites before arriving.    If you have been told by a doctor that you were severely ill with COVID-19 or are immunocompromised, you should isolate for at least 10 days.    You should not go back to work until you meet the guidelines above for ending your home isolation. You don't need to be retested for COVID-19 before going back to work--studies show that you won't spread the virus if it's been at least 10 days since your symptoms started (or 20 days, if you have a weak immune system).    Employers, schools, and daycares: This is an official notice for this person's medical guidelines for returning in-person.  They must meet the above guidelines before going back to work, school or  in person.    You will receive a positive COVID-19 letter via [employer] or the mail soon with additional self-care information (exception, no letters will be sent to presurgical/preprocedure patients).    Would you like me to review some of that information with you now?  No    If you were tested for an upcoming procedure, please contact your provider for next steps.    Ashleigh Wu